# Patient Record
Sex: FEMALE | Race: ASIAN | Employment: FULL TIME | URBAN - METROPOLITAN AREA
[De-identification: names, ages, dates, MRNs, and addresses within clinical notes are randomized per-mention and may not be internally consistent; named-entity substitution may affect disease eponyms.]

---

## 2017-06-19 NOTE — TELEPHONE ENCOUNTER
Pt requesting refills on Progesterone 200mg q hs and Estradiol 0.5mg/daily. Pt has not been seen since 7/21/16. Spoke to pt at 14:40, scheduled pt for a f/u appt 6/20/17. Will have Dr. Yung Mccurdy refill meds at that time.

## 2017-06-20 ENCOUNTER — OFFICE VISIT (OUTPATIENT)
Dept: OBGYN CLINIC | Facility: CLINIC | Age: 57
End: 2017-06-20

## 2017-06-20 DIAGNOSIS — N95.1 MENOPAUSAL SYMPTOMS: Primary | ICD-10-CM

## 2017-06-20 PROCEDURE — 99212 OFFICE O/P EST SF 10 MIN: CPT | Performed by: OBSTETRICS & GYNECOLOGY

## 2017-06-20 RX ORDER — ESTRADIOL 0.5 MG/1
TABLET ORAL
COMMUNITY
Start: 2017-01-08 | End: 2017-06-20

## 2017-06-20 RX ORDER — LOSARTAN POTASSIUM 50 MG/1
TABLET ORAL
COMMUNITY
Start: 2017-01-26

## 2017-06-20 RX ORDER — ESTRADIOL 0.5 MG/1
0.5 TABLET ORAL DAILY
Qty: 90 TABLET | Refills: 3 | Status: SHIPPED | OUTPATIENT
Start: 2017-06-20 | End: 2018-05-21

## 2017-06-20 RX ORDER — FLUTICASONE PROPIONATE 50 MCG
1 SPRAY, SUSPENSION (ML) NASAL
COMMUNITY
Start: 2017-02-14

## 2017-06-20 RX ORDER — PYRIDOXINE HCL (VITAMIN B6) 100 MG
100 TABLET ORAL
COMMUNITY

## 2017-06-20 RX ORDER — MONTELUKAST SODIUM 10 MG/1
TABLET ORAL
COMMUNITY
Start: 2017-01-26

## 2017-06-20 RX ORDER — DESONIDE 0.5 MG/G
1 CREAM TOPICAL
COMMUNITY
Start: 2014-10-24

## 2017-06-20 RX ORDER — PREDNISOLONE ACETATE 10 MG/ML
SUSPENSION/ DROPS OPHTHALMIC
COMMUNITY
Start: 2017-01-09

## 2017-06-20 NOTE — PROGRESS NOTES
GYNECOLOGY RETURN VISIT          2017  9:58 AM    CC:Patient presents for annual.    HPI: Patient is a 64year old  No LMP recorded. who presents for annual but too early. LPS 2016 normal pap, HPV negative. Last mammo 2017.   She needs a ref female here for refill HRT. Refilled estrace and prometrium.       Callum Nelson MD

## 2017-09-04 DIAGNOSIS — N95.1 MENOPAUSAL SYMPTOMS: ICD-10-CM

## 2017-09-05 ENCOUNTER — TELEPHONE (OUTPATIENT)
Dept: OBGYN CLINIC | Facility: CLINIC | Age: 57
End: 2017-09-05

## 2017-09-05 NOTE — TELEPHONE ENCOUNTER
Needs a 90 days supply for progesterone not 30  Day supply.  Please resend rx per pt she pays more out of pocket with a 30 days supply

## 2017-09-05 NOTE — TELEPHONE ENCOUNTER
Refill request for Progesterone 200mg/nightly. Per Dr. Bo Mcdermott, ok to refill x 1. Sent to pharmacy.

## 2017-09-05 NOTE — TELEPHONE ENCOUNTER
Msg rec'd from pt that she needs a 90 day supply of Progesterone as she pays less. Called [facility] and informed them to give her 90 days one time.

## 2017-12-01 DIAGNOSIS — N95.1 MENOPAUSAL SYMPTOMS: ICD-10-CM

## 2018-02-26 DIAGNOSIS — N95.1 MENOPAUSAL SYMPTOMS: ICD-10-CM

## 2018-02-26 NOTE — TELEPHONE ENCOUNTER
Refill request rec'd for Progesterone 200mg nightly. Pt last seen for annual on 6/20/17. Sent one refill to pts pharmacy.

## 2018-05-21 DIAGNOSIS — N95.1 MENOPAUSAL SYMPTOMS: ICD-10-CM

## 2018-05-22 RX ORDER — ESTRADIOL 0.5 MG/1
TABLET ORAL
Qty: 90 TABLET | Refills: 3 | Status: SHIPPED | OUTPATIENT
Start: 2018-05-22 | End: 2018-08-16

## 2018-08-03 DIAGNOSIS — N95.1 MENOPAUSAL SYMPTOMS: ICD-10-CM

## 2018-08-06 NOTE — TELEPHONE ENCOUNTER
Refill request rec'd from pharmacy for Progesterone 200mg. Pt last seen on 6/20/17 for annual. LM for pt to contact office to schedule an appt for continued refills.

## 2018-08-06 NOTE — TELEPHONE ENCOUNTER
Spoke to pt and scheduled her for an annual exam on 8/16/18. Sent in 1 refill (#30) for her Progesterone.

## 2018-08-16 ENCOUNTER — OFFICE VISIT (OUTPATIENT)
Dept: OBGYN CLINIC | Facility: CLINIC | Age: 58
End: 2018-08-16
Payer: COMMERCIAL

## 2018-08-16 VITALS
DIASTOLIC BLOOD PRESSURE: 76 MMHG | HEIGHT: 67.5 IN | BODY MASS INDEX: 24.2 KG/M2 | SYSTOLIC BLOOD PRESSURE: 120 MMHG | WEIGHT: 156 LBS

## 2018-08-16 DIAGNOSIS — Z01.419 ENCOUNTER FOR GYNECOLOGICAL EXAMINATION WITHOUT ABNORMAL FINDING: Primary | ICD-10-CM

## 2018-08-16 DIAGNOSIS — N95.2 ATROPHIC VAGINITIS: ICD-10-CM

## 2018-08-16 PROCEDURE — 99396 PREV VISIT EST AGE 40-64: CPT | Performed by: OBSTETRICS & GYNECOLOGY

## 2018-08-16 PROCEDURE — 87624 HPV HI-RISK TYP POOLED RSLT: CPT | Performed by: OBSTETRICS & GYNECOLOGY

## 2018-08-16 RX ORDER — ESTRADIOL 0.1 MG/G
1 CREAM VAGINAL EVERY OTHER DAY
Qty: 1 TUBE | Refills: 6 | Status: SHIPPED | OUTPATIENT
Start: 2018-08-16 | End: 2019-08-16

## 2018-08-16 NOTE — PROGRESS NOTES
GYN H&P     2018  11:43 AM    CC: Patient is here for annual.     HPI: Patient is a 62year old  for annual. She has been on HRT for 2 - 3 year and has not tried weaning. She took them for hot flashes/vaginal dryness.  Concerned about future cristal appearing, no lesions, normal hair distribution   Urethral meatus appear wnl, no abnormal discharge or lesions noted.    Bladder: well supported, urethra wnl, no palpable tenderness or masses, no discharge  Vagina: normal pink mucosa, no lesions, normal bessie

## 2018-08-17 LAB — HPV I/H RISK 1 DNA SPEC QL NAA+PROBE: NEGATIVE

## 2018-11-19 ENCOUNTER — PATIENT MESSAGE (OUTPATIENT)
Dept: OBGYN CLINIC | Facility: CLINIC | Age: 58
End: 2018-11-19

## 2018-11-19 ENCOUNTER — TELEPHONE (OUTPATIENT)
Dept: OBGYN CLINIC | Facility: CLINIC | Age: 58
End: 2018-11-19

## 2018-11-19 NOTE — TELEPHONE ENCOUNTER
Per pt, she has been using the estradiol cream with positive results and just switched to 2x/week usage and noticed some pimples on her face and right eye is swollen. She uses gtts for dry eye, but has not changed anything recently.  Encouraged pt to stop u

## 2018-11-19 NOTE — TELEPHONE ENCOUNTER
From: Stevan Carpenter  To: Mauricio Geller MD  Sent: 11/19/2018 10:02 AM CST  Subject: Prescription Question    Hello, I've been using the estradiol cream for 3-4 weeks. I've started to get pimples, and my right eyelid is starting to swell.  I looke

## (undated) NOTE — MR AVS SNAPSHOT
[address] at [address], [facility] 1  [phone]               Thank you for choosing us for your health care visit with Belkys Guevara MD.  We are glad to serve you and happy to deniz Pyridoxine HCl 100 MG Tabs   Take 100 mg by mouth. Commonly known as:  B-6           VITAMIN B-12 OR   Take by mouth.                 Where to Get Your Medications      These medications were sent to [facility] #[id number]- [place], [place] - [address]  active are less likely to develop some chronic diseases than adults who are inactive.      HOW TO GET STARTED: HOW TO STAY MOTIVATED:   Start activities slowly and build up over time Do what you like   Get your heart pumping – brisk walking, biking, swimmin